# Patient Record
Sex: MALE | Race: WHITE | NOT HISPANIC OR LATINO
[De-identification: names, ages, dates, MRNs, and addresses within clinical notes are randomized per-mention and may not be internally consistent; named-entity substitution may affect disease eponyms.]

---

## 2018-03-28 ENCOUNTER — HOSPITAL ENCOUNTER (OUTPATIENT)
Dept: HOME HEALTH SERVICES | Facility: HOME HEALTHCARE | Age: 83
Setting detail: SPECIMEN
Discharge: HOME | End: 2018-03-28
Attending: FAMILY MEDICINE | Admitting: FAMILY MEDICINE

## 2018-03-28 LAB
BASOPHILS # BLD AUTO: 0 10*3/UL (ref 0–0.2)
BASOPHILS NFR BLD AUTO: 0 % (ref 0–2)
DIFFERENTIAL METHOD BLD: (no result)
EOSINOPHIL # BLD AUTO: 0 10*3/UL (ref 0–0.3)
EOSINOPHIL # BLD AUTO: 1 % (ref 0–3)
ERYTHROCYTE [DISTWIDTH] IN BLOOD BY AUTOMATED COUNT: 18.4 % (ref 11.5–14.5)
HCT VFR BLD AUTO: 33.5 % (ref 40–54)
HGB BLD-MCNC: 10.9 G/DL (ref 14–18)
LYMPHOCYTES # BLD AUTO: 0.7 10*3/UL (ref 0.8–4.8)
LYMPHOCYTES NFR BLD AUTO: 22 % (ref 18–42)
MCH RBC QN AUTO: 31.5 PG (ref 26–32)
MCHC RBC AUTO-ENTMCNC: 32.6 G/DL (ref 32–36)
MCV RBC AUTO: 96.7 FL (ref 80–94)
MONOCYTES # BLD AUTO: 0.5 10*3/UL (ref 0.1–1.3)
MONOCYTES NFR BLD AUTO: 14 % (ref 2–11)
NEUTROPHILS # BLD AUTO: 2.2 10*3/UL (ref 2.3–8.6)
NEUTROPHILS NFR BLD AUTO: 63 % (ref 50–75)
NRBC BLD AUTO-RTO: 0 /100{WBCS}
NRBC/RBC NFR BLD MANUAL: 0 10*3/UL
PLATELET # BLD AUTO: 133 10*3/UL (ref 150–450)
PMV BLD AUTO: 9.4 FL (ref 7.4–10.4)
RBC # BLD AUTO: 3.46 10*6/UL (ref 4.6–6)
WBC # BLD AUTO: 3.5 10*3/UL (ref 4.5–11.5)

## 2018-03-29 LAB
BILIRUB UR QL STRIP: NEGATIVE MG/DL
CASTS URNS QL MICRO: NORMAL /[LPF]
COLOR UR: YELLOW
CONV BACTERIA IN URINE MICRO: NEGATIVE
CONV CLARITY OF URINE: CLEAR
CONV HYALINE CASTS IN URINE MICRO: 1 /[LPF] (ref 0–5)
CONV PROTEIN IN URINE BY AUTOMATED TEST STRIP: NEGATIVE MG/DL
CONV SMALL ROUND CELLS: NORMAL /[HPF]
CONV UROBILINOGEN IN URINE BY AUTOMATED TEST STRIP: 0.2 MG/DL
CULTURE INDICATED?: NORMAL
GLUCOSE UR QL: NEGATIVE MG/DL
HGB UR QL STRIP: NEGATIVE
KETONES UR QL STRIP: NEGATIVE MG/DL
LEUKOCYTE ESTERASE UR QL STRIP: NEGATIVE
NITRITE UR QL STRIP: NEGATIVE
PH UR STRIP.AUTO: 7.5 [PH] (ref 4.5–8)
RBC #/AREA URNS HPF: 0 /[HPF] (ref 0–3)
SP GR UR: 1.01 (ref 1–1.03)
SPERM URNS QL MICRO: NORMAL /[HPF]
SQUAMOUS SPT QL MICRO: 0 /[HPF] (ref 0–5)
UNIDENT CRYS URNS QL MICRO: NORMAL /[HPF]
WBC #/AREA URNS HPF: 1 /[HPF] (ref 0–5)
YEAST SPEC QL WET PREP: NORMAL /[HPF]

## 2018-04-03 ENCOUNTER — HOSPITAL ENCOUNTER (OUTPATIENT)
Dept: HOME HEALTH SERVICES | Facility: HOME HEALTHCARE | Age: 83
Setting detail: SPECIMEN
Discharge: HOME | End: 2018-04-03
Attending: FAMILY MEDICINE | Admitting: FAMILY MEDICINE

## 2018-04-03 LAB
BASOPHILS # BLD AUTO: 0 10*3/UL (ref 0–0.2)
BASOPHILS NFR BLD AUTO: 1 % (ref 0–2)
DIFFERENTIAL METHOD BLD: (no result)
EOSINOPHIL # BLD AUTO: 0 10*3/UL (ref 0–0.3)
EOSINOPHIL # BLD AUTO: 1 % (ref 0–3)
ERYTHROCYTE [DISTWIDTH] IN BLOOD BY AUTOMATED COUNT: 19.4 % (ref 11.5–14.5)
HCT VFR BLD AUTO: 34.2 % (ref 40–54)
HGB BLD-MCNC: 11.2 G/DL (ref 14–18)
LYMPHOCYTES # BLD AUTO: 0.7 10*3/UL (ref 0.8–4.8)
LYMPHOCYTES NFR BLD AUTO: 22 % (ref 18–42)
MCH RBC QN AUTO: 31.8 PG (ref 26–32)
MCHC RBC AUTO-ENTMCNC: 32.9 G/DL (ref 32–36)
MCV RBC AUTO: 96.6 FL (ref 80–94)
MONOCYTES # BLD AUTO: 0.5 10*3/UL (ref 0.1–1.3)
MONOCYTES NFR BLD AUTO: 15 % (ref 2–11)
NEUTROPHILS # BLD AUTO: 2 10*3/UL (ref 2.3–8.6)
NEUTROPHILS NFR BLD AUTO: 61 % (ref 50–75)
NRBC BLD AUTO-RTO: 0 /100{WBCS}
NRBC/RBC NFR BLD MANUAL: 0 10*3/UL
PLATELET # BLD AUTO: 107 10*3/UL (ref 150–450)
PMV BLD AUTO: 9.5 FL (ref 7.4–10.4)
RBC # BLD AUTO: 3.54 10*6/UL (ref 4.6–6)
WBC # BLD AUTO: 3.3 10*3/UL (ref 4.5–11.5)

## 2018-04-18 ENCOUNTER — HOSPITAL ENCOUNTER (OUTPATIENT)
Dept: HOME HEALTH SERVICES | Facility: HOME HEALTHCARE | Age: 83
Setting detail: SPECIMEN
Discharge: HOME | End: 2018-04-18
Attending: FAMILY MEDICINE | Admitting: FAMILY MEDICINE

## 2018-04-18 LAB
BASOPHILS # BLD AUTO: 0 10*3/UL (ref 0–0.2)
BASOPHILS NFR BLD AUTO: 1 % (ref 0–2)
DIFFERENTIAL METHOD BLD: (no result)
EOSINOPHIL # BLD AUTO: 0 10*3/UL (ref 0–0.3)
EOSINOPHIL # BLD AUTO: 1 % (ref 0–3)
ERYTHROCYTE [DISTWIDTH] IN BLOOD BY AUTOMATED COUNT: 18.5 % (ref 11.5–14.5)
HCT VFR BLD AUTO: 37.2 % (ref 40–54)
HGB BLD-MCNC: 12.2 G/DL (ref 14–18)
LYMPHOCYTES # BLD AUTO: 0.9 10*3/UL (ref 0.8–4.8)
LYMPHOCYTES NFR BLD AUTO: 23 % (ref 18–42)
MCH RBC QN AUTO: 31.6 PG (ref 26–32)
MCHC RBC AUTO-ENTMCNC: 32.9 G/DL (ref 32–36)
MCV RBC AUTO: 96 FL (ref 80–94)
MONOCYTES # BLD AUTO: 0.5 10*3/UL (ref 0.1–1.3)
MONOCYTES NFR BLD AUTO: 14 % (ref 2–11)
NEUTROPHILS # BLD AUTO: 2.3 10*3/UL (ref 2.3–8.6)
NEUTROPHILS NFR BLD AUTO: 61 % (ref 50–75)
NRBC BLD AUTO-RTO: 0 /100{WBCS}
NRBC/RBC NFR BLD MANUAL: 0 10*3/UL
PLATELET # BLD AUTO: 129 10*3/UL (ref 150–450)
PMV BLD AUTO: 9.2 FL (ref 7.4–10.4)
RBC # BLD AUTO: 3.87 10*6/UL (ref 4.6–6)
WBC # BLD AUTO: 3.8 10*3/UL (ref 4.5–11.5)

## 2018-04-25 ENCOUNTER — OFFICE (OUTPATIENT)
Dept: URBAN - METROPOLITAN AREA CLINIC 64 | Facility: CLINIC | Age: 83
End: 2018-04-25
Payer: COMMERCIAL

## 2018-04-25 VITALS
WEIGHT: 174 LBS | HEART RATE: 85 BPM | SYSTOLIC BLOOD PRESSURE: 135 MMHG | HEIGHT: 67 IN | DIASTOLIC BLOOD PRESSURE: 81 MMHG

## 2018-04-25 DIAGNOSIS — D50.0 IRON DEFICIENCY ANEMIA SECONDARY TO BLOOD LOSS (CHRONIC): ICD-10-CM

## 2018-04-25 DIAGNOSIS — K21.9 GASTRO-ESOPHAGEAL REFLUX DISEASE WITHOUT ESOPHAGITIS: ICD-10-CM

## 2018-04-25 DIAGNOSIS — K59.00 CONSTIPATION, UNSPECIFIED: ICD-10-CM

## 2018-04-25 PROCEDURE — 99202 OFFICE O/P NEW SF 15 MIN: CPT | Performed by: INTERNAL MEDICINE

## 2018-06-07 ENCOUNTER — OFFICE (OUTPATIENT)
Dept: URBAN - METROPOLITAN AREA CLINIC 64 | Facility: CLINIC | Age: 83
End: 2018-06-07
Payer: COMMERCIAL

## 2018-06-07 VITALS
HEART RATE: 54 BPM | WEIGHT: 173 LBS | HEIGHT: 67 IN | SYSTOLIC BLOOD PRESSURE: 152 MMHG | DIASTOLIC BLOOD PRESSURE: 67 MMHG

## 2018-06-07 DIAGNOSIS — D50.0 IRON DEFICIENCY ANEMIA SECONDARY TO BLOOD LOSS (CHRONIC): ICD-10-CM

## 2018-06-07 DIAGNOSIS — K59.00 CONSTIPATION, UNSPECIFIED: ICD-10-CM

## 2018-06-07 PROCEDURE — 99214 OFFICE O/P EST MOD 30 MIN: CPT | Performed by: NURSE PRACTITIONER

## 2018-08-21 ENCOUNTER — HOSPITAL ENCOUNTER (OUTPATIENT)
Dept: LAB | Facility: HOSPITAL | Age: 83
Discharge: HOME | End: 2018-08-21
Attending: PHYSICIAN ASSISTANT | Admitting: PHYSICIAN ASSISTANT

## 2018-08-21 LAB — CREAT UR-MCNC: 1 MG/DL (ref 0.7–1.2)

## 2018-09-05 ENCOUNTER — HOSPITAL ENCOUNTER (OUTPATIENT)
Dept: CT IMAGING | Facility: HOSPITAL | Age: 83
Discharge: HOME | End: 2018-09-05
Attending: PHYSICIAN ASSISTANT | Admitting: PHYSICIAN ASSISTANT

## 2019-06-27 ENCOUNTER — CLINICAL SUPPORT NO REQUIREMENTS (OUTPATIENT)
Dept: CARDIOLOGY | Facility: CLINIC | Age: 84
End: 2019-06-27

## 2019-06-27 DIAGNOSIS — I49.5 SICK SINUS SYNDROME (HCC): Primary | ICD-10-CM

## 2019-06-27 DIAGNOSIS — Z95.0 PRESENCE OF CARDIAC PACEMAKER: ICD-10-CM

## 2019-06-27 PROCEDURE — 93294 REM INTERROG EVL PM/LDLS PM: CPT | Performed by: NURSE PRACTITIONER

## 2019-06-27 PROCEDURE — 93296 REM INTERROG EVL PM/IDS: CPT | Performed by: NURSE PRACTITIONER

## 2019-06-28 PROBLEM — I49.5 SICK SINUS SYNDROME (HCC): Status: ACTIVE | Noted: 2019-06-28

## 2019-06-28 PROBLEM — Z95.0 PRESENCE OF CARDIAC PACEMAKER: Status: ACTIVE | Noted: 2019-06-28

## 2019-06-28 NOTE — PROGRESS NOTES
REMOTE DEVICE INTERROGATION    Device Company: [employer]    Battery Stable.  Time to ALIS 8 years    Presenting EGM: A paced V sensed    Arrhythmia Logbook Reviewed: No sustained events    Continue remote device interrogations every 3 months.

## 2019-08-08 ENCOUNTER — OFFICE VISIT (OUTPATIENT)
Dept: CARDIOLOGY | Facility: CLINIC | Age: 84
End: 2019-08-08

## 2019-08-08 VITALS
HEART RATE: 60 BPM | SYSTOLIC BLOOD PRESSURE: 130 MMHG | WEIGHT: 171.6 LBS | BODY MASS INDEX: 26.01 KG/M2 | OXYGEN SATURATION: 98 % | HEIGHT: 68 IN | DIASTOLIC BLOOD PRESSURE: 68 MMHG

## 2019-08-08 DIAGNOSIS — I10 ESSENTIAL HYPERTENSION: ICD-10-CM

## 2019-08-08 DIAGNOSIS — Z95.0 PRESENCE OF CARDIAC PACEMAKER: ICD-10-CM

## 2019-08-08 DIAGNOSIS — I48.0 PAROXYSMAL ATRIAL FIBRILLATION (HCC): ICD-10-CM

## 2019-08-08 DIAGNOSIS — I63.10 CEREBROVASCULAR ACCIDENT (CVA) DUE TO EMBOLISM OF PRECEREBRAL ARTERY (HCC): ICD-10-CM

## 2019-08-08 DIAGNOSIS — I49.5 SICK SINUS SYNDROME (HCC): Primary | ICD-10-CM

## 2019-08-08 PROBLEM — I63.9 CEREBROVASCULAR ACCIDENT (CVA): Status: ACTIVE | Noted: 2019-08-08

## 2019-08-08 PROBLEM — E78.2 HYPERLIPIDEMIA, MIXED: Status: ACTIVE | Noted: 2019-08-08

## 2019-08-08 PROBLEM — I71.40 ABDOMINAL AORTIC ANEURYSM (AAA) (HCC): Status: ACTIVE | Noted: 2018-03-07

## 2019-08-08 PROCEDURE — 99213 OFFICE O/P EST LOW 20 MIN: CPT | Performed by: INTERNAL MEDICINE

## 2019-08-08 PROCEDURE — 93000 ELECTROCARDIOGRAM COMPLETE: CPT | Performed by: INTERNAL MEDICINE

## 2019-08-08 RX ORDER — POTASSIUM CHLORIDE 1500 MG/1
20 TABLET, FILM COATED, EXTENDED RELEASE ORAL 2 TIMES DAILY
COMMUNITY
Start: 2018-09-07 | End: 2021-12-15

## 2019-08-08 RX ORDER — DONEPEZIL HYDROCHLORIDE 10 MG/1
TABLET, FILM COATED ORAL
Refills: 5 | COMMUNITY
Start: 2019-07-07 | End: 2021-09-13

## 2019-08-08 RX ORDER — ROSUVASTATIN CALCIUM 10 MG/1
10 TABLET, COATED ORAL EVERY 24 HOURS
COMMUNITY
Start: 2017-10-17

## 2019-08-08 RX ORDER — TAMSULOSIN HYDROCHLORIDE 0.4 MG/1
1 CAPSULE ORAL DAILY
Refills: 1 | COMMUNITY
Start: 2019-07-08 | End: 2020-09-11

## 2019-08-08 RX ORDER — APIXABAN 2.5 MG/1
TABLET, FILM COATED ORAL 2 TIMES DAILY
Refills: 1 | COMMUNITY
Start: 2019-07-07

## 2019-08-08 RX ORDER — CARBIDOPA AND LEVODOPA 25; 100 MG/1; MG/1
TABLET, ORALLY DISINTEGRATING ORAL EVERY 12 HOURS
COMMUNITY
Start: 2019-01-08

## 2019-08-08 RX ORDER — LOSARTAN POTASSIUM 50 MG/1
TABLET ORAL DAILY
Refills: 5 | COMMUNITY
Start: 2019-07-07 | End: 2020-02-25 | Stop reason: ALTCHOICE

## 2019-08-08 RX ORDER — METOPROLOL SUCCINATE 50 MG/1
TABLET, EXTENDED RELEASE ORAL DAILY
Refills: 3 | COMMUNITY
Start: 2019-07-07 | End: 2019-10-05 | Stop reason: SDUPTHER

## 2019-08-08 RX ORDER — POLYETHYLENE GLYCOL 3350 17 G/17G
POWDER, FOR SOLUTION ORAL DAILY
COMMUNITY
Start: 2018-11-13

## 2019-08-08 RX ORDER — PANTOPRAZOLE SODIUM 40 MG/1
TABLET, DELAYED RELEASE ORAL DAILY
Refills: 5 | COMMUNITY
Start: 2019-07-07 | End: 2021-09-15 | Stop reason: SDDI

## 2019-08-08 NOTE — PROGRESS NOTES
Cardiology Office Visit Note      Referring physician:  Nitza    Reason For Followup: 6 month FU    HPI:  Jason Callejas is a 89 y.o. male  presents today for f/u history of  Paroxysmal afib/RVR, SSS, hypertensive heart dz, dyslipidemia and TIA.  Dual chamber PM placed 9/19/18.    Decreased functional status due to balance issues.  Ambulates with walker.     He returns today with no specific complaints.  He denies chest pain, dyspnea, PND,  orthopnea, palpitations, near syncope, lower extremity edema or feelings of his heart racing.             Past Medical History:   Diagnosis Date   • History of echocardiogram 08/2018   • History of Holter monitoring 08/2018   • Hyperlipidemia    • Hypertension    • Stroke (CMS/HCC)        Past Surgical History:   Procedure Laterality Date   • CHOLECYSTECTOMY     • TURP / TRANSURETHRAL INCISION / DRAINAGE PROSTATE      TURP         Current Outpatient Medications:   •  carbidopa-levodopa (PARCOPA)  MG per disintegrating tablet, Every 12 (Twelve) Hours., Disp: , Rfl:   •  donepezil (ARICEPT) 10 MG tablet, TK 1 T PO QD IN THE MAEVE, Disp: , Rfl: 5  •  ELIQUIS 2.5 MG tablet tablet, 2 (Two) Times a Day., Disp: , Rfl: 1  •  losartan (COZAAR) 50 MG tablet, Take  by mouth Daily., Disp: , Rfl: 5  •  MAGNESIUM-OXIDE 400 (241.3 Mg) MG tablet tablet, Take  by mouth Daily., Disp: , Rfl: 3  •  metoprolol succinate XL (TOPROL-XL) 50 MG 24 hr tablet, Take  by mouth Daily., Disp: , Rfl: 3  •  pantoprazole (PROTONIX) 40 MG EC tablet, Take  by mouth Daily., Disp: , Rfl: 5  •  polyethylene glycol (MIRALAX) powder, Daily., Disp: , Rfl:   •  potassium chloride ER (K-TAB) 20 MEQ tablet controlled-release ER tablet, POTASSIUM CHLORIDE ER 20 MEQ CR-TABS, Disp: , Rfl:   •  rosuvastatin (CRESTOR) 10 MG tablet, Daily., Disp: , Rfl:   •  tamsulosin (FLOMAX) 0.4 MG capsule 24 hr capsule, Daily., Disp: , Rfl: 1    Social History     Socioeconomic History   • Marital status:      Spouse name: Not  "on file   • Number of children: Not on file   • Years of education: Not on file   • Highest education level: Not on file   Tobacco Use   • Smoking status: Never Smoker   Substance and Sexual Activity   • Alcohol use: No     Frequency: Never   • Drug use: No       Family History   Family history unknown: Yes         Review of Systems   General: denies fever, chills, anorexia, weight loss  Eyes: denies blurring, diplopia  Ear/Nose/Throat: denies ear pain, nosebleeds, hoarseness  Cardiovascular: See HPI  Respiratory: denies excessive sputum, hemoptysis, wheezing  Gastrointestinal: denies nausea, vomiting, change in bowel habits, abdominal pain  Genitourinary: denies dysuria and hematuria  Musculoskeletal: Modest low back and weightbearing joint pain with lower extremity weakness and ataxia requiring him to use a walker now  Skin: denies rashes, itching, suspicious lesions  Neurologic: denies focal neuro deficits, though has problems with ataxia and walks with a walker  Psychiatric: denies depression, anxiety  Endocrine: denies cold intolerance, heat intolerance  Hematologic/Lymphatic: denies abnormal bruising, bleeding  Allergic/Immunologic: denies urticaria or persistent infections      Objective     Visit Vitals  /68   Pulse 60   Ht 171.5 cm (67.5\")   Wt 77.8 kg (171 lb 9.6 oz)   SpO2 98% Comment: room air   BMI 26.48 kg/m²           Physical Exam  General:      Pleasant, well developed,, in no acute distress.    Head:     normocephalic and atraumatic.    Eyes:    PERRL/EOM intact, conjunctiva and sclera clear with out nystagmus.    Neck:    no jvd or bruits  Chest Wall:    no deformities   Lungs:    clear bilaterally to auscultation with adequate global airflow   Heart:    non-displaced PMI; regular rate and rhythm, normal S1, S2 without murmurs, rubs, or gallops  Abdomen:  Soft, nontender without HSM  Msk:    no deformity; adequate R OM; his left knee and Ace wrap  Pulses:    pulses normal in all 4 " extremities.    Extremities:    no clubbing, cyanosis, edema   Neurologic:    no focal sensory or motor deficits; ambulates using a walker because of ataxia and lower extremity weakness  Skin:    intact without lesions or rashes.    Psych:    alert and cooperative; normal mood and affect; normal attention span and concentration.            ECG 12 Lead  Date/Time: 8/8/2019 11:46 PM  Performed by: ELISE Giles MD  Authorized by: ELISE Giles MD   Comparison: compared with previous ECG from 1/8/2019  Comparison to previous ECG: Previous tracing showed sinus rhythm with nonspecific T wave changes  Comments: Today's EKG shows 100% a pacing with OR interval of 240 ms and 100% V tracking with no ST nor T wave abnormalities              Assessment:   1. Sick sinus syndrome (CMS/HCC)  -Now well controlled with beta-blocker and permanent pacing    2. Presence of cardiac pacemaker  -Satisfactory device site and functioning    3. Cerebrovascular accident (CVA) due to embolism of precerebral artery (CMS/HCC)  -Modest residual neuropathy though now having problems with ataxia and lower extremity weakness    4. Paroxysmal atrial fibrillation (CMS/HCC)  -Currently maintaining sinus/a paced rhythm  -Maintaining full anticoagulation using Eliquis 2.5 mg p.o. twice daily    5. Essential hypertension  Well-controlled on current medications        Plan:  Continue current medications as listed and reviewed in detail today.  Return to clinic for follow-up in 6 months or sooner if needed.  Strongly cautioned on risk of falling    ELISE Giles MD  8/31/2019 11:47 PM

## 2019-09-26 ENCOUNTER — CLINICAL SUPPORT NO REQUIREMENTS (OUTPATIENT)
Dept: CARDIOLOGY | Facility: CLINIC | Age: 84
End: 2019-09-26

## 2019-09-26 DIAGNOSIS — I49.5 SICK SINUS SYNDROME (HCC): Primary | ICD-10-CM

## 2019-09-26 DIAGNOSIS — Z95.0 PRESENCE OF CARDIAC PACEMAKER: ICD-10-CM

## 2019-09-26 PROCEDURE — 93294 REM INTERROG EVL PM/LDLS PM: CPT | Performed by: NURSE PRACTITIONER

## 2019-09-26 PROCEDURE — 93296 REM INTERROG EVL PM/IDS: CPT | Performed by: NURSE PRACTITIONER

## 2019-09-30 NOTE — PROGRESS NOTES
REMOTE DEVICE INTERROGATION    Remote device interrogation is reviewed.     Device Company: [employer]    Battery Stable.  Time to ALIS 7.5 years    Presenting EGM: A paced V paced    Arrhythmia Logbook Reviewed: No sustained events    Device function appears Stable    Continue remote device interrogations every 3 months.

## 2019-10-07 RX ORDER — METOPROLOL SUCCINATE 50 MG/1
TABLET, EXTENDED RELEASE ORAL
Qty: 90 TABLET | Refills: 0 | Status: SHIPPED | OUTPATIENT
Start: 2019-10-07 | End: 2020-01-06

## 2019-12-26 ENCOUNTER — CLINICAL SUPPORT NO REQUIREMENTS (OUTPATIENT)
Dept: CARDIOLOGY | Facility: CLINIC | Age: 84
End: 2019-12-26

## 2019-12-26 DIAGNOSIS — Z95.0 PRESENCE OF CARDIAC PACEMAKER: ICD-10-CM

## 2019-12-26 DIAGNOSIS — I49.5 SICK SINUS SYNDROME (HCC): Primary | ICD-10-CM

## 2020-01-06 RX ORDER — METOPROLOL SUCCINATE 50 MG/1
TABLET, EXTENDED RELEASE ORAL
Qty: 90 TABLET | Refills: 3 | Status: SHIPPED | OUTPATIENT
Start: 2020-01-06 | End: 2021-09-13

## 2020-01-09 PROCEDURE — 93294 REM INTERROG EVL PM/LDLS PM: CPT | Performed by: NURSE PRACTITIONER

## 2020-01-09 PROCEDURE — 93296 REM INTERROG EVL PM/IDS: CPT | Performed by: NURSE PRACTITIONER

## 2020-01-09 NOTE — PROGRESS NOTES
REMOTE DEVICE INTERROGATION    Remote device interrogation is reviewed.     Device Company: [employer]    Battery Stable.  Time to ALIS 7.5 years    Presenting EGM: A paced V sense    Arrhythmia Logbook Reviewed: No sustained events    Device function appears Stable    Continue remote device interrogations every 3 months.

## 2020-02-19 ENCOUNTER — CLINICAL SUPPORT NO REQUIREMENTS (OUTPATIENT)
Dept: CARDIOLOGY | Facility: CLINIC | Age: 85
End: 2020-02-19

## 2020-02-19 ENCOUNTER — OFFICE VISIT (OUTPATIENT)
Dept: CARDIOLOGY | Facility: CLINIC | Age: 85
End: 2020-02-19

## 2020-02-19 VITALS
DIASTOLIC BLOOD PRESSURE: 52 MMHG | SYSTOLIC BLOOD PRESSURE: 92 MMHG | HEART RATE: 92 BPM | BODY MASS INDEX: 24.55 KG/M2 | HEIGHT: 68 IN | WEIGHT: 162 LBS

## 2020-02-19 DIAGNOSIS — I49.5 SICK SINUS SYNDROME (HCC): Primary | ICD-10-CM

## 2020-02-19 DIAGNOSIS — Z95.0 PRESENCE OF CARDIAC PACEMAKER: ICD-10-CM

## 2020-02-19 DIAGNOSIS — I48.0 PAROXYSMAL ATRIAL FIBRILLATION (HCC): ICD-10-CM

## 2020-02-19 DIAGNOSIS — I10 ESSENTIAL HYPERTENSION: ICD-10-CM

## 2020-02-19 PROCEDURE — 99214 OFFICE O/P EST MOD 30 MIN: CPT | Performed by: NURSE PRACTITIONER

## 2020-02-19 PROCEDURE — 93280 PM DEVICE PROGR EVAL DUAL: CPT | Performed by: NURSE PRACTITIONER

## 2020-02-19 PROCEDURE — 93000 ELECTROCARDIOGRAM COMPLETE: CPT | Performed by: NURSE PRACTITIONER

## 2020-02-25 NOTE — PROGRESS NOTES
Cardiology Office Follow Up Visit      Primary Care Provider:  Anish Goddard MD    Reason for f/u:     Sick sinus syndrome  Hypotension  History of hypertension  Paroxysmal atrial fibrillation  Dual-chamber pacemaker      Subjective     CC:    Denies chest pain or dyspnea    History of Present Illness       Jason Callejas is a 89 y.o. male.  He has a history of sick sinus syndrome with dual-chamber pacemaker implanted by Dr. Giles in September 2018.  Additional past medical history is significant for paroxysmal atrial fibrillation, hypertension, dyslipidemia and previous TIA.    He has recently had shingles across his back.    He has decreased functional status due to balance problems and ambulates with a walker.    He does report recently that his blood pressures have been running on the lower side and he has been dizzy at times.    He denies any chest pain, dyspnea, PND, orthopnea, palpitations, near syncope, lower extremity edema or feelings of his heart racing.  He reports compliance with medical therapy.    Last office follow-up note is reviewed.        Past Medical History:   Diagnosis Date   • Abdominal aortic aneurysm (AAA) (CMS/[facility]) 3/7/2018   • Cerebrovascular accident (CVA) (CMS/[facility]) 8/8/2019   • Essential hypertension 8/8/2019   • History of echocardiogram 08/2018   • History of Holter monitoring 08/2018   • Hyperlipidemia    • Hyperlipidemia, mixed 8/8/2019   • Hypertension    • Paroxysmal atrial fibrillation (CMS/HCC) 10/15/2012   • Presence of cardiac pacemaker 6/28/2019    BS PM implant 9/2018   • Sick sinus syndrome (CMS/HCC) 6/28/2019   • Stroke (CMS/[facility])        Past Surgical History:   Procedure Laterality Date   • CHOLECYSTECTOMY     • TURP / TRANSURETHRAL INCISION / DRAINAGE PROSTATE      TURP         Current Outpatient Medications:   •  carbidopa-levodopa (PARCOPA)  MG per disintegrating tablet, Every 12 (Twelve) Hours., Disp: , Rfl:   •  donepezil (ARICEPT) 10 MG tablet, TK 1 T PO  QD IN THE MAEVE, Disp: , Rfl: 5  •  ELIQUIS 2.5 MG tablet tablet, 2 (Two) Times a Day., Disp: , Rfl: 1  •  ipratropium (ATROVENT) 0.03 % nasal spray, U 2 SPRAYS IEN 2 TO 3 XD, Disp: , Rfl:   •  losartan (COZAAR) 50 MG tablet, Take  by mouth Daily., Disp: , Rfl: 5  •  MAGNESIUM-OXIDE 400 (241.3 Mg) MG tablet tablet, Take  by mouth Daily., Disp: , Rfl: 3  •  metoprolol succinate XL (TOPROL-XL) 50 MG 24 hr tablet, TAKE 1 TABLET BY MOUTH EVERY DAY, Disp: 90 tablet, Rfl: 3  •  MYRBETRIQ 50 MG tablet sustained-release 24 hour 24 hr tablet, Take 50 mg by mouth Daily., Disp: , Rfl:   •  pantoprazole (PROTONIX) 40 MG EC tablet, Take  by mouth Daily., Disp: , Rfl: 5  •  polyethylene glycol (MIRALAX) powder, Daily., Disp: , Rfl:   •  potassium chloride ER (K-TAB) 20 MEQ tablet controlled-release ER tablet, Take 20 mEq by mouth 2 (Two) Times a Day., Disp: , Rfl:   •  rosuvastatin (CRESTOR) 10 MG tablet, Take 10 mg by mouth Daily., Disp: , Rfl:   •  tamsulosin (FLOMAX) 0.4 MG capsule 24 hr capsule, 1 capsule Daily., Disp: , Rfl: 1    Social History     Socioeconomic History   • Marital status:      Spouse name: Not on file   • Number of children: Not on file   • Years of education: Not on file   • Highest education level: Not on file   Tobacco Use   • Smoking status: Never Smoker   Substance and Sexual Activity   • Alcohol use: No     Frequency: Never   • Drug use: No   • Sexual activity: Defer       Family History   Family history unknown: Yes       The following portions of the patient's history were reviewed and updated as appropriate: allergies, current medications, past family history, past medical history, past social history, past surgical history and problem list.    Review of Systems   Constitution: Negative for decreased appetite and diaphoresis.   HENT: Negative for congestion, hearing loss and nosebleeds.    Cardiovascular: Negative for chest pain, claudication, dyspnea on exertion, irregular heartbeat, leg  "swelling, near-syncope, orthopnea, palpitations, paroxysmal nocturnal dyspnea and syncope.   Respiratory: Negative for cough, shortness of breath and sleep disturbances due to breathing.    Endocrine: Negative for polyuria.   Hematologic/Lymphatic: Does not bruise/bleed easily.   Skin: Negative for itching and rash.   Musculoskeletal: Positive for arthritis and muscle weakness. Negative for back pain and myalgias.   Gastrointestinal: Negative for abdominal pain, change in bowel habit and nausea.   Genitourinary: Negative for dysuria, flank pain, frequency and hesitancy.   Neurological: Positive for dizziness and weakness. Negative for tremors.   Psychiatric/Behavioral: Negative for altered mental status. The patient does not have insomnia.        BP 92/52   Pulse 92   Ht 171.5 cm (67.52\")   Wt 73.5 kg (162 lb)   BMI 24.98 kg/m² .    Objective     Physical Exam   Constitutional: He is oriented to person, place, and time. He appears well-developed and well-nourished. No distress.   HENT:   Head: Normocephalic and atraumatic.   Eyes: Pupils are equal, round, and reactive to light.   Neck: Normal range of motion. Neck supple. No JVD present.   Cardiovascular: Normal rate, regular rhythm, S1 normal, S2 normal, normal heart sounds and intact distal pulses.   No murmur heard.  Pulmonary/Chest: Effort normal and breath sounds normal.   Abdominal: Soft. Normal appearance. He exhibits no distension. There is no tenderness.   Musculoskeletal: Normal range of motion. He exhibits no edema.   Neurological: He is alert and oriented to person, place, and time.   Skin: Skin is warm and dry.   Psychiatric: He has a normal mood and affect.           ECG 12 Lead  Date/Time: 2/19/2020 3:05 PM  Performed by: Jillian Lunsford APRN  Authorized by: Jillian Lunsford APRN   Comparison: not compared with previous ECG   Previous ECG: no previous ECG available  Rhythm: sinus rhythm  Rate: normal  BPM: 92  Conduction: conduction normal  ST " Segments: ST segments normal  T Waves: T waves normal  QRS axis: normal  Other: no other findings    Clinical impression: normal ECG                In Office Device Interrogation:     DEVICE INTERROGATION:  IN OFFICE    DEVICE TYPE:   Dual-chamber pacemaker    :   [employer]    BATTERY:  Stable    TIME TO ELECTIVE REPLACEMENT INDICATORS:   8 years    CHARGE TIME:   Not applicable        LEAD DATA:    Atrial:   7.6 mV, 373 ohms, 0.8 V@0.4 ms    Ventricular:     14.4 mV, 792 ohms, 0.1 V@0.4 ms    LV:      Atrial pacing percentage: 65 %    Ventricular pacing percentage: 32 %      Arrhythmia Logbook Reviewed: Less than 1        Summary:    Stable Device Function    No significant arhythmia burden.     Battery status is stable.      NEXT IN OFFICE DEVICE CHECK DUE: 1 year    REMOTE DEVICE INTERROGATIONS: 3 months      Diagnoses and all orders for this visit:    1. Paroxysmal atrial fibrillation (CMS/HCC) (Primary)  Comments:  No recent recurrent atrial fibrillation noted on device interrogation    2. Sick sinus syndrome (CMS/HCC)  Comments:  Stable since pacemaker implant    3. Essential hypertension  Comments:  Blood pressure is actually running on the low side though he has a history of hypertension.  Losartan was discontinued    4. Presence of cardiac pacemaker  Comments:  Stable device function    Other orders  -     ECG 12 Lead                Plan:  Patient recently recently had shingles.  They are improving.  His blood pressures have been running low.  Currently 92/52.  At this time I have asked him to hold his losartan.  I have asked him to log his blood pressures at home.  Goal systolic would be less than 130.  If his blood pressure starts creeping above this number of asked him to contact our office and we will consider resuming a lower dose losartan.  I have made no other changes in his medicine.  His device function is stable.  We will continue other current medicines as prescribed.    He  will have scheduled follow-up with Dr. Giles in 6 months I have asked him to contact the office sooner if he should develop any new or worsening problems

## 2020-04-09 ENCOUNTER — CLINICAL SUPPORT NO REQUIREMENTS (OUTPATIENT)
Dept: CARDIOLOGY | Facility: CLINIC | Age: 85
End: 2020-04-09

## 2020-04-09 DIAGNOSIS — I49.5 SICK SINUS SYNDROME (HCC): Primary | ICD-10-CM

## 2020-04-09 DIAGNOSIS — Z95.0 PRESENCE OF CARDIAC PACEMAKER: ICD-10-CM

## 2020-04-09 PROCEDURE — 93296 REM INTERROG EVL PM/IDS: CPT | Performed by: NURSE PRACTITIONER

## 2020-04-09 PROCEDURE — 93294 REM INTERROG EVL PM/LDLS PM: CPT | Performed by: NURSE PRACTITIONER

## 2020-04-09 NOTE — PROGRESS NOTES
REMOTE DEVICE INTERROGATION    Received and reviewed on:   4/9/2020    Remote device interrogation is reviewed.     Device Company: [employer]    Battery Stable.  Time to ALIS 7.5 years    Presenting EGM: A sensed V sense    Arrhythmia Logbook Reviewed: Very brief SVT lasting less than 20 seconds no recurrent    Device function appears Stable    Continue remote device interrogations every 3 months.

## 2020-05-18 ENCOUNTER — OFFICE VISIT (OUTPATIENT)
Dept: CARDIOLOGY | Facility: CLINIC | Age: 85
End: 2020-05-18

## 2020-05-18 VITALS
DIASTOLIC BLOOD PRESSURE: 58 MMHG | SYSTOLIC BLOOD PRESSURE: 129 MMHG | WEIGHT: 162 LBS | HEART RATE: 60 BPM | HEIGHT: 68 IN | BODY MASS INDEX: 24.55 KG/M2

## 2020-05-18 DIAGNOSIS — E78.2 HYPERLIPIDEMIA, MIXED: ICD-10-CM

## 2020-05-18 DIAGNOSIS — I49.5 SICK SINUS SYNDROME (HCC): Primary | ICD-10-CM

## 2020-05-18 DIAGNOSIS — I48.0 PAROXYSMAL ATRIAL FIBRILLATION (HCC): ICD-10-CM

## 2020-05-18 DIAGNOSIS — Z95.0 PRESENCE OF CARDIAC PACEMAKER: ICD-10-CM

## 2020-05-18 DIAGNOSIS — I10 ESSENTIAL HYPERTENSION: ICD-10-CM

## 2020-05-18 DIAGNOSIS — I63.10 CEREBROVASCULAR ACCIDENT (CVA) DUE TO EMBOLISM OF PRECEREBRAL ARTERY (HCC): ICD-10-CM

## 2020-05-18 PROCEDURE — 99442 PR PHYS/QHP TELEPHONE EVALUATION 11-20 MIN: CPT | Performed by: INTERNAL MEDICINE

## 2020-05-18 NOTE — ASSESSMENT & PLAN NOTE
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

## 2020-05-18 NOTE — PROGRESS NOTES
Cardiology Office Visit Note      Referring physician:  Dr. Anish Goddard    Reason For Followup: 3 month follow up  You have chosen to receive care through a telephone visit. Do you consent to use a telephone visit for your medical care today? yes  HPI:  Jason Callejas is a 89 y.o. male reports today for a telephone visit .He has a history of sick sinus syndrome with dual-chamber pacemaker implanted by Dr. Giles in September 2018.  Additional past medical history is significant for paroxysmal atrial fibrillation, hypertension, dyslipidemia and previous TIA.    Patient does check his b/p at home and its ranging in the 120's; heart rate well regulated with rates usually in the 60s    He has decreased functional status due to balance problems and ambulates with a walker.      He denies any chest pain, dyspnea, PND, orthopnea, palpitations, near syncope, lower extremity edema or feelings of his heart racing.  He reports compliance with medical therapy.  This visit has been rescheduled as a phone visit to comply with patient safety concerns in accordance with CDC recommendations. Total time of discussion was 14 minutes.      .     Past Medical History:   Diagnosis Date   • Abdominal aortic aneurysm (AAA) (CMS/[facility]) 3/7/2018   • Cerebrovascular accident (CVA) (CMS/HCC) 8/8/2019   • Essential hypertension 8/8/2019   • History of echocardiogram 08/2018   • History of Holter monitoring 08/2018   • Hyperlipidemia    • Hyperlipidemia, mixed 8/8/2019   • Hypertension    • Paroxysmal atrial fibrillation (CMS/HCC) 10/15/2012   • Presence of cardiac pacemaker 6/28/2019    BS PM implant 9/2018   • Sick sinus syndrome (CMS/HCC) 6/28/2019   • Stroke (CMS/[facility])        Past Surgical History:   Procedure Laterality Date   • CHOLECYSTECTOMY     • TURP / TRANSURETHRAL INCISION / DRAINAGE PROSTATE      TURP         Current Outpatient Medications:   •  carbidopa-levodopa (PARCOPA)  MG per disintegrating tablet, Every 12 (Twelve)  Hours., Disp: , Rfl:   •  donepezil (ARICEPT) 10 MG tablet, TK 1 T PO QD IN THE MAEVE, Disp: , Rfl: 5  •  ELIQUIS 2.5 MG tablet tablet, 2 (Two) Times a Day., Disp: , Rfl: 1  •  ipratropium (ATROVENT) 0.03 % nasal spray, U 2 SPRAYS IEN 2 TO 3 XD, Disp: , Rfl:   •  MAGNESIUM-OXIDE 400 (241.3 Mg) MG tablet tablet, Take  by mouth Daily., Disp: , Rfl: 3  •  metoprolol succinate XL (TOPROL-XL) 50 MG 24 hr tablet, TAKE 1 TABLET BY MOUTH EVERY DAY, Disp: 90 tablet, Rfl: 3  •  MYRBETRIQ 50 MG tablet sustained-release 24 hour 24 hr tablet, Take 50 mg by mouth Daily., Disp: , Rfl:   •  pantoprazole (PROTONIX) 40 MG EC tablet, Take  by mouth Daily., Disp: , Rfl: 5  •  polyethylene glycol (MIRALAX) powder, Daily., Disp: , Rfl:   •  potassium chloride ER (K-TAB) 20 MEQ tablet controlled-release ER tablet, Take 20 mEq by mouth 2 (Two) Times a Day., Disp: , Rfl:   •  rosuvastatin (CRESTOR) 10 MG tablet, Take 10 mg by mouth Daily., Disp: , Rfl:   •  tamsulosin (FLOMAX) 0.4 MG capsule 24 hr capsule, 1 capsule Daily., Disp: , Rfl: 1    Social History     Socioeconomic History   • Marital status:      Spouse name: Not on file   • Number of children: Not on file   • Years of education: Not on file   • Highest education level: Not on file   Tobacco Use   • Smoking status: Never Smoker   • Smokeless tobacco: Never Used   Substance and Sexual Activity   • Alcohol use: No     Frequency: Never   • Drug use: No   • Sexual activity: Defer       Family History   Family history unknown: Yes         Review of Systems   General: denies fever, chills, anorexia, weight loss  Eyes: denies blurring, diplopia  Ear/Nose/Throat: denies ear pain, nosebleeds, hoarseness, though does have substantial sensorineural hearing loss-making it very difficult to communicate with him over the telephone, so wife supplied most of the history during today's phone visit.  Cardiovascular: See HPI  Respiratory: denies excessive sputum, hemoptysis,  "wheezing  Gastrointestinal: denies nausea, vomiting, change in bowel habits, abdominal pain  Genitourinary: Nocturia reported usually once to twice nightly; denies hematuria or dysuria  Musculoskeletal: Modest generalized arthralgias with lower extremity weakness requiring use of walker  Skin: denies rashes, itching, suspicious lesions  Neurologic: denies focal neuro deficits, though does have ataxia and declining cognitive function  Psychiatric: denies depression, anxiety  Endocrine: denies cold intolerance, heat intolerance  Hematologic/Lymphatic: denies abnormal bruising, bleeding  Allergic/Immunologic: denies urticaria or persistent infections      Objective     Visit Vitals  /58   Pulse 60   Ht 171.5 cm (67.52\")   Wt 73.5 kg (162 lb)   BMI 24.98 kg/m²           Physical Exam-this is a limited physical exam description based entirely on the patient and his wife's observations as they were reported to me during today's telephone visit.  General:      Pleasant, well-nourished, well developed,, in no acute distress.  HEENT: Unremarkable with no scleral icterus or loss of visual acuity.  Does have substantial sensorineural hearing loss despite wearing hearing aids has very difficult time with hearing, especially on the telephone.  Neck: Adequate ROM without neck masses or JVD noted.  Lungs: Demonstrate satisfactory respiratory rate and pattern with no conversational dyspnea.  Cardiac reports regular heart rhythm and normal heart rates.  Denies irregular or fast heart beating episodes.  Abdomen: Soft without localized tenderness or masses.   Msk:    no deformity; adequate R OM  Pulses:    pulses were not assessed on today's phone visit   Extremities:    no edema or peripheral cyanosis appreciated  Neurologic:    no focal sensory or motor deficits, though does have significant ataxia  Skin:    intact without lesions or rashes.    Psych:    alert and cooperative; normal mood and affect; normal attention span and " concentration.          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 Callejas advised to continue current medications as listed and reviewed in detail today.  As always, he and his wife are strongly cautioned against any increased risk for falling.  He understands he is to use his walker at all times.  We hope to see Mr. Callejas for follow-up in the office in 4 months or sooner if needed.    ELISE Giles MD  5/18/2020 14:45    This report was generated using the Dragon voice recognition system.

## 2020-05-18 NOTE — ASSESSMENT & PLAN NOTE
Generally maintained in sinus rhythm with Toprol-XL and AV sequential pacemaker though remains fully anticoagulated using low-dose Eliquis

## 2020-07-14 ENCOUNTER — CLINICAL SUPPORT NO REQUIREMENTS (OUTPATIENT)
Dept: CARDIOLOGY | Facility: CLINIC | Age: 85
End: 2020-07-14

## 2020-07-14 DIAGNOSIS — Z95.0 PRESENCE OF CARDIAC PACEMAKER: ICD-10-CM

## 2020-07-14 DIAGNOSIS — I49.5 SICK SINUS SYNDROME (HCC): Primary | ICD-10-CM

## 2020-07-14 PROCEDURE — 93294 REM INTERROG EVL PM/LDLS PM: CPT | Performed by: NURSE PRACTITIONER

## 2020-07-14 PROCEDURE — 93296 REM INTERROG EVL PM/IDS: CPT | Performed by: NURSE PRACTITIONER

## 2020-07-20 NOTE — PROGRESS NOTES
REMOTE DEVICE INTERROGATION    Received and reviewed on:   7/14/2020    Remote device interrogation is reviewed.     Device Company: [employer]    Battery Stable.  Time to ALIS 7 years    Presenting EGM: A sensed V sense    Arrhythmia Logbook Reviewed: No sustained events    Device function appears Stable    Continue remote device interrogations every 3 months.

## 2020-09-11 ENCOUNTER — OFFICE VISIT (OUTPATIENT)
Dept: CARDIOLOGY | Facility: CLINIC | Age: 85
End: 2020-09-11

## 2020-09-11 VITALS
BODY MASS INDEX: 23.19 KG/M2 | SYSTOLIC BLOOD PRESSURE: 120 MMHG | OXYGEN SATURATION: 96 % | HEART RATE: 64 BPM | WEIGHT: 153 LBS | DIASTOLIC BLOOD PRESSURE: 60 MMHG | HEIGHT: 68 IN

## 2020-09-11 DIAGNOSIS — I63.10 CEREBROVASCULAR ACCIDENT (CVA) DUE TO EMBOLISM OF PRECEREBRAL ARTERY (HCC): ICD-10-CM

## 2020-09-11 DIAGNOSIS — I49.5 SICK SINUS SYNDROME (HCC): Primary | ICD-10-CM

## 2020-09-11 PROCEDURE — 93000 ELECTROCARDIOGRAM COMPLETE: CPT | Performed by: INTERNAL MEDICINE

## 2020-09-11 PROCEDURE — 99213 OFFICE O/P EST LOW 20 MIN: CPT | Performed by: INTERNAL MEDICINE

## 2020-09-11 NOTE — PROGRESS NOTES
Cardiology Office Visit Note      Referring physician:  Anish Goddard MD    Reason For Followup:     HPI:  Jason Callejas is a 89 y.o. male. Presents to [facility] for a follow up visit. He has a history of sick sinus syndrome with dual-chamber pacemaker implanted by Dr. Giles in September 2018.  Additional past medical history is significant for paroxysmal atrial fibrillation, hypertension, dyslipidemia and previous TIA.    Patient does check his b/p at home and its ranging in the 120's; heart rate well regulated with rates usually in the 60s    He has decreased functional status due to balance problems and ambulates with a walker.      He denies any chest pain, dyspnea, PND, orthopnea, palpitations, near syncope, lower extremity edema or feelings of his heart racing.  He reports compliance with medical therapy.          Past Medical History:   Diagnosis Date   • Abdominal aortic aneurysm (AAA) (CMS/[facility]) 3/7/2018   • Cerebrovascular accident (CVA) (CMS/[facility]) 8/8/2019   • Essential hypertension 8/8/2019   • History of echocardiogram 08/2018   • History of Holter monitoring 08/2018   • Hyperlipidemia    • Hyperlipidemia, mixed 8/8/2019   • Hypertension    • Paroxysmal atrial fibrillation (CMS/HCC) 10/15/2012   • Presence of cardiac pacemaker 6/28/2019    BS PM implant 9/2018   • Sick sinus syndrome (CMS/[facility]) 6/28/2019   • Stroke (CMS/[facility])        Past Surgical History:   Procedure Laterality Date   • CHOLECYSTECTOMY     • TURP / TRANSURETHRAL INCISION / DRAINAGE PROSTATE      TURP         Current Outpatient Medications:   •  carbidopa-levodopa (PARCOPA)  MG per disintegrating tablet, Every 12 (Twelve) Hours., Disp: , Rfl:   •  donepezil (ARICEPT) 10 MG tablet, TK 1 T PO QD IN THE MAEVE, Disp: , Rfl: 5  •  ELIQUIS 2.5 MG tablet tablet, 2 (Two) Times a Day., Disp: , Rfl: 1  •  ipratropium (ATROVENT) 0.03 % nasal spray, U 2 SPRAYS IEN 2 TO 3 XD, Disp: , Rfl:   •  MAGNESIUM-OXIDE 400 (241.3 Mg) MG tablet tablet, Take   by mouth Daily., Disp: , Rfl: 3  •  metoprolol succinate XL (TOPROL-XL) 50 MG 24 hr tablet, TAKE 1 TABLET BY MOUTH EVERY DAY, Disp: 90 tablet, Rfl: 3  •  MYRBETRIQ 50 MG tablet sustained-release 24 hour 24 hr tablet, Take 50 mg by mouth Daily., Disp: , Rfl:   •  pantoprazole (PROTONIX) 40 MG EC tablet, Take  by mouth Daily., Disp: , Rfl: 5  •  polyethylene glycol (MIRALAX) powder, Daily., Disp: , Rfl:   •  potassium chloride ER (K-TAB) 20 MEQ tablet controlled-release ER tablet, Take 20 mEq by mouth 2 (Two) Times a Day., Disp: , Rfl:   •  rosuvastatin (CRESTOR) 10 MG tablet, Take 10 mg by mouth Daily., Disp: , Rfl:     Social History     Socioeconomic History   • Marital status:      Spouse name: Not on file   • Number of children: Not on file   • Years of education: Not on file   • Highest education level: Not on file   Tobacco Use   • Smoking status: Never Smoker   • Smokeless tobacco: Never Used   Substance and Sexual Activity   • Alcohol use: No     Frequency: Never   • Drug use: No   • Sexual activity: Defer       Family History   Family history unknown: Yes         Review of Systems   General: denies fever, chills, anorexia, weight loss  Eyes: denies blurring, diplopia  Ear/Nose/Throat: denies ear pain, nosebleeds, hoarseness; though, extremely hard of hearing  Cardiovascular: See HPI  Respiratory: denies excessive sputum, hemoptysis, wheezing  Gastrointestinal: denies nausea, vomiting, change in bowel habits, abdominal pain  Genitourinary: Nocturia usually 1 or 2 times per night but denies hematuria or dysuria  Musculoskeletal: Modest low back and weight bearing arthralgias  Skin: denies rashes, itching, suspicious lesions  Neurologic: denies focal neuro deficits  Psychiatric: denies depression, anxiety  Endocrine: denies cold intolerance, heat intolerance  Hematologic/Lymphatic: denies abnormal bruising, bleeding  Allergic/Immunologic: denies urticaria or persistent infections      Objective     Visit  "Vitals  /60   Pulse 64   Ht 171.5 cm (67.52\")   Wt 69.4 kg (153 lb)   SpO2 96%   BMI 23.60 kg/m²           Physical Exam  General:     Obese, well developed,, in no acute distress.    Head:     normocephalic and atraumatic.    Eyes:    PERRL/EOM intact, conjunctiva and sclera clear with out nystagmus  Ears: Severe sensorineural hearing loss.  Remains very hard of hearing despite having hearing aids.    Neck:    no jvd or bruits  Chest Wall:    no deformities   Lungs:    clear bilaterally to auscultation with adequate global airflow   Heart:    non-displaced PMI; regular rate and rhythm, normal S1, S2 without murmurs, rubs, or gallops  Abdomen:  Soft, nontender without HSM  Msk:    no deformity; adequate R OM  Pulses:    pulses normal in all 4 extremities.    Extremities:    no clubbing, cyanosis, edema   Neurologic:    no focal sensory or motor deficits  Skin:    intact without lesions or rashes.    Psych:    alert and cooperative; normal mood and affect; normal attention span and concentration.            ECG 12 Lead    Date/Time: 9/11/2020 10:40 AM  Performed by: ELISE Giles MD  Authorized by: ELISE Giles MD   Comparison: compared with previous ECG from 2/25/2020  Similar to previous ECG  Rhythm: paced  QRS axis: left    Clinical impression: abnormal EKG  Comments: Man atrial paced rhythm with ventricular sensing, no other significant normalities              Assessment:   Problems Addressed this Visit        Cardiovascular and Mediastinum    Sick sinus syndrome (CMS/HCC) - Primary    Well-controlled with AV sequential pacemaker and beta-blocker        Cerebrovascular accident (CVA) (CMS/HCC)    -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  Return to clinic 6 months or sooner if needed    ELISE Giles MD  9/27/2020 21:41 EDT    This report was generated using the Dragon voice recognition system.

## 2021-03-15 ENCOUNTER — OFFICE VISIT (OUTPATIENT)
Dept: CARDIOLOGY | Facility: CLINIC | Age: 86
End: 2021-03-15

## 2021-03-15 VITALS
OXYGEN SATURATION: 98 % | DIASTOLIC BLOOD PRESSURE: 72 MMHG | BODY MASS INDEX: 26.06 KG/M2 | SYSTOLIC BLOOD PRESSURE: 126 MMHG | HEIGHT: 67 IN | WEIGHT: 166 LBS | HEART RATE: 80 BPM

## 2021-03-15 DIAGNOSIS — I63.10 CEREBROVASCULAR ACCIDENT (CVA) DUE TO EMBOLISM OF PRECEREBRAL ARTERY (HCC): ICD-10-CM

## 2021-03-15 DIAGNOSIS — I49.5 SICK SINUS SYNDROME (HCC): ICD-10-CM

## 2021-03-15 DIAGNOSIS — I48.0 PAROXYSMAL ATRIAL FIBRILLATION (HCC): Primary | ICD-10-CM

## 2021-03-15 PROCEDURE — 93000 ELECTROCARDIOGRAM COMPLETE: CPT | Performed by: INTERNAL MEDICINE

## 2021-03-15 PROCEDURE — 99213 OFFICE O/P EST LOW 20 MIN: CPT | Performed by: INTERNAL MEDICINE

## 2021-03-15 RX ORDER — LEVOCETIRIZINE DIHYDROCHLORIDE 5 MG/1
5 TABLET, FILM COATED ORAL EVERY EVENING
COMMUNITY
Start: 2021-02-25 | End: 2021-09-15 | Stop reason: SDDI

## 2021-04-16 PROCEDURE — 93294 REM INTERROG EVL PM/LDLS PM: CPT | Performed by: NURSE PRACTITIONER

## 2021-04-16 PROCEDURE — 93296 REM INTERROG EVL PM/IDS: CPT | Performed by: NURSE PRACTITIONER

## 2021-07-16 PROCEDURE — 93294 REM INTERROG EVL PM/LDLS PM: CPT | Performed by: NURSE PRACTITIONER

## 2021-07-16 PROCEDURE — 93296 REM INTERROG EVL PM/IDS: CPT | Performed by: NURSE PRACTITIONER

## 2021-09-13 ENCOUNTER — CLINICAL SUPPORT NO REQUIREMENTS (OUTPATIENT)
Dept: CARDIOLOGY | Facility: CLINIC | Age: 86
End: 2021-09-13

## 2021-09-13 ENCOUNTER — OFFICE VISIT (OUTPATIENT)
Dept: CARDIOLOGY | Facility: CLINIC | Age: 86
End: 2021-09-13

## 2021-09-13 VITALS
HEIGHT: 67 IN | WEIGHT: 163 LBS | BODY MASS INDEX: 25.58 KG/M2 | SYSTOLIC BLOOD PRESSURE: 134 MMHG | OXYGEN SATURATION: 97 % | DIASTOLIC BLOOD PRESSURE: 72 MMHG | HEART RATE: 110 BPM

## 2021-09-13 DIAGNOSIS — I10 ESSENTIAL HYPERTENSION: ICD-10-CM

## 2021-09-13 DIAGNOSIS — Z95.0 PRESENCE OF CARDIAC PACEMAKER: ICD-10-CM

## 2021-09-13 DIAGNOSIS — I48.0 PAROXYSMAL ATRIAL FIBRILLATION (HCC): ICD-10-CM

## 2021-09-13 DIAGNOSIS — I49.5 SICK SINUS SYNDROME (HCC): Primary | ICD-10-CM

## 2021-09-13 DIAGNOSIS — I47.1 PSVT (PAROXYSMAL SUPRAVENTRICULAR TACHYCARDIA) (HCC): ICD-10-CM

## 2021-09-13 PROCEDURE — 99214 OFFICE O/P EST MOD 30 MIN: CPT | Performed by: NURSE PRACTITIONER

## 2021-09-13 PROCEDURE — 93280 PM DEVICE PROGR EVAL DUAL: CPT | Performed by: NURSE PRACTITIONER

## 2021-09-13 RX ORDER — METOPROLOL SUCCINATE 25 MG/1
25 TABLET, EXTENDED RELEASE ORAL DAILY
Qty: 30 TABLET | Refills: 11 | Status: SHIPPED | OUTPATIENT
Start: 2021-09-13 | End: 2022-09-12

## 2021-09-15 PROBLEM — I47.1 PSVT (PAROXYSMAL SUPRAVENTRICULAR TACHYCARDIA) (HCC): Status: ACTIVE | Noted: 2021-09-15

## 2021-09-15 PROCEDURE — 93000 ELECTROCARDIOGRAM COMPLETE: CPT | Performed by: NURSE PRACTITIONER

## 2021-09-15 NOTE — PROGRESS NOTES
Cardiology Office Follow Up Visit      Primary Care Provider:  Anish Goddard MD    Reason for f/u:     Sick sinus syndrome  Dual-chamber pacemaker  Hypertension  PAF  PSVT      Subjective     CC:    Denies chest pain or dyspnea    History of Present Illness       Jason Callejas is a 89 y.o. male.  Patient is here today for follow-up regarding his history of sick sinus syndrome and dual-chamber [employer] pacemaker implanted back in September 2018.    Additional past medical history is significant for paroxysmal atrial fibrillation, hypertension, dyslipidemia, previous TIA, Parkinson's disease.  He ambulates with a walker.    At this time according the patient his wife he has been doing fair.  He is had no chest pain or dyspnea.  He continues to be unsteady on his feet.  He has had no palpitations or reported feelings of his heart racing.  He denies any lower extremity edema.            Past Medical History:   Diagnosis Date   • Abdominal aortic aneurysm (AAA) (CMS/[facility]) 3/7/2018   • Cerebrovascular accident (CVA) (CMS/[facility]) 8/8/2019   • Essential hypertension 8/8/2019   • History of echocardiogram 08/2018   • History of Holter monitoring 08/2018   • Hyperlipidemia    • Hyperlipidemia, mixed 8/8/2019   • Hypertension    • Paroxysmal atrial fibrillation (CMS/[facility]) 10/15/2012   • Presence of cardiac pacemaker 6/28/2019    BS PM implant 9/2018   • Sick sinus syndrome (CMS/[facility]) 6/28/2019   • Stroke (CMS/[facility])        Past Surgical History:   Procedure Laterality Date   • CHOLECYSTECTOMY     • TURP / TRANSURETHRAL INCISION / DRAINAGE PROSTATE      TURP         Current Outpatient Medications:   •  carbidopa-levodopa (PARCOPA)  MG per disintegrating tablet, Every 12 (Twelve) Hours., Disp: , Rfl:   •  ELIQUIS 2.5 MG tablet tablet, 2 (Two) Times a Day., Disp: , Rfl: 1  •  ipratropium (ATROVENT) 0.03 % nasal spray, U 2 SPRAYS IEN 2 TO 3 XD, Disp: , Rfl:   •  MAGNESIUM-OXIDE 400 (241.3 Mg) MG tablet tablet, Take   by mouth Daily., Disp: , Rfl: 3  •  polyethylene glycol (MIRALAX) powder, Daily., Disp: , Rfl:   •  potassium chloride ER (K-TAB) 20 MEQ tablet controlled-release ER tablet, Take 20 mEq by mouth 2 (Two) Times a Day., Disp: , Rfl:   •  rosuvastatin (CRESTOR) 10 MG tablet, Take 10 mg by mouth Daily., Disp: , Rfl:   •  levocetirizine (XYZAL) 5 MG tablet, Take 5 mg by mouth Every Evening., Disp: , Rfl:   •  metoprolol succinate XL (TOPROL-XL) 25 MG 24 hr tablet, Take 1 tablet by mouth Daily., Disp: 30 tablet, Rfl: 11  •  MYRBETRIQ 50 MG tablet sustained-release 24 hour 24 hr tablet, Take 50 mg by mouth Daily., Disp: , Rfl:   •  pantoprazole (PROTONIX) 40 MG EC tablet, Take  by mouth Daily., Disp: , Rfl: 5    Social History     Socioeconomic History   • Marital status:      Spouse name: Not on file   • Number of children: Not on file   • Years of education: Not on file   • Highest education level: Not on file   Tobacco Use   • Smoking status: Never Smoker   • Smokeless tobacco: Never Used   Substance and Sexual Activity   • Alcohol use: No   • Drug use: No   • Sexual activity: Defer       Family History   Family history unknown: Yes       The following portions of the patient's history were reviewed and updated as appropriate: allergies, current medications, past family history, past medical history, past social history, past surgical history and problem list.    Review of Systems   Constitutional: Negative for decreased appetite and diaphoresis.   HENT: Negative for congestion, hearing loss and nosebleeds.    Cardiovascular: Negative for chest pain, claudication, dyspnea on exertion, irregular heartbeat, leg swelling, near-syncope, orthopnea, palpitations, paroxysmal nocturnal dyspnea and syncope.   Respiratory: Negative for cough, shortness of breath and sleep disturbances due to breathing.    Endocrine: Negative for polyuria.   Hematologic/Lymphatic: Does not bruise/bleed easily.   Skin: Negative for itching  "and rash.   Musculoskeletal: Positive for arthritis, back pain and muscle weakness. Negative for myalgias.   Gastrointestinal: Negative for abdominal pain, change in bowel habit and nausea.   Genitourinary: Negative for dysuria, flank pain, frequency and hesitancy.   Neurological: Positive for disturbances in coordination and weakness. Negative for dizziness and tremors.   Psychiatric/Behavioral: Negative for altered mental status. The patient does not have insomnia.        /72   Pulse 110   Ht 170.2 cm (67\")   Wt 73.9 kg (163 lb)   SpO2 97%   BMI 25.53 kg/m² .    Objective     Constitutional:       General: Not in acute distress.     Appearance: Normal appearance. Well-developed.   Eyes:      Pupils: Pupils are equal, round, and reactive to light.   HENT:      Head: Normocephalic and atraumatic.   Neck:      Vascular: No JVD.   Pulmonary:      Effort: Pulmonary effort is normal.      Breath sounds: Normal breath sounds.   Cardiovascular:      Normal rate. Regular rhythm.   Pulses:     Intact distal pulses.   Edema:     Peripheral edema absent.   Abdominal:      General: There is no distension.      Palpations: Abdomen is soft.      Tenderness: There is no abdominal tenderness.   Musculoskeletal: Normal range of motion.      Cervical back: Normal range of motion and neck supple.      Comments: Ambulates with walker Skin:     General: Skin is warm and dry.   Neurological:      Mental Status: Alert and oriented to person, place, and time.           EKG ordered and reviewed by me in office    ECG 12 Lead    Date/Time: 9/15/2021 2:14 PM  Performed by: Jillian Lunsford APRN  Authorized by: Jillian Lunsford APRN   Comparison: not compared with previous ECG   Rhythm: sinus tachycardia  BPM: 110  Q waves: III and aVF      Clinical impression: abnormal EKG                In Office Device Interrogation: Reviewed    DEVICE INTERROGATION:  IN OFFICE    DEVICE TYPE:   Dual-chamber pacemaker    :   [employer]    BATTERY:  Stable    TIME TO ELECTIVE REPLACEMENT INDICATORS:   6-year    CHARGE TIME:   Not applicable        LEAD DATA:    Atrial:   4.3 mV, 355 ohms, 0.8 V@0.4 ms    Ventricular:     17.8 mV, 627 ohms, 0.3 V@0.4 ms    LV:      Atrial pacing percentage: 50%    Ventricular pacing percentage: 10%      Arrhythmia Logbook Reviewed: No A. fib paroxysms of PSVT noted resting heart rate above 100        Summary:    Stable Device Function    Frequent episodes of PSVT and resting heart rate noted to be over 100    Battery status is stable.      NEXT IN OFFICE DEVICE CHECK DUE: 6 months    REMOTE DEVICE INTERROGATIONS: 3 months      Diagnoses and all orders for this visit:    1. Sick sinus syndrome (CMS/HCC) (Primary)  Comments:  Stable since pacemaker implant    2. Presence of cardiac pacemaker  Comments:  Dual-chamber [employer] pacemaker with stable device function    3. Paroxysmal atrial fibrillation (CMS/HCC)  Comments:  No recurrent atrial fibrillation though he is having episodes of paroxysmal supraventricular tachycardia    4. Essential hypertension    5. PSVT (paroxysmal supraventricular tachycardia) (CMS/HCC)  Comments:  Pacemaker interrogation reveals multiple episodes of PSVT    Other orders  -     metoprolol succinate XL (TOPROL-XL) 25 MG 24 hr tablet; Take 1 tablet by mouth Daily.  Dispense: 30 tablet; Refill: 11           MEDICAL DECISION MAKING:           Patient is here today for follow-up regarding his history of sick sinus syndrome, previous dual-chamber pacemaker.    His resting heart rate is elevated today.  Reviewing his home medicines he is reported to be on Toprol-XL 50 mg daily.    However upon review of his medications closely we contacted his pharmacy reports he has not had that medicine filled in over 1 year.    I have talked to his wife and encouraged her to bring all of his medicines with him to every appointment.  I have resent a prescription for metoprolol XL 25 mg once  daily to his pharmacy and advised her to start those today.    The patient is not having any other signs of acute cardiac decompensation.  He is having progressive decline in his functional status due to unsteady gait and coordination difficulties.    He denies any overt chest pain or dyspnea.    We will plan on seeing him for scheduled follow-up back here in 3 months.  The patient and his wife wish to follow-up with Dr. Odell since the wife sees Dr. Odell as well I will continue to monitor his pacemaker via [employer].  If he develops any new or worsening problems of asked his wife to contact our office sooner for follow-up

## 2021-10-15 PROCEDURE — 93294 REM INTERROG EVL PM/LDLS PM: CPT | Performed by: NURSE PRACTITIONER

## 2021-10-15 PROCEDURE — 93296 REM INTERROG EVL PM/IDS: CPT | Performed by: NURSE PRACTITIONER

## 2021-12-14 NOTE — PROGRESS NOTES
Date of Office Visit: 12/15/2021  Encounter Provider: Dr. Jordy Odell  Place of Service: [facility] CARDIOLOGY [place]  Patient Name: Jason Callejas  :1926  Anish Goddard MD    Chief Complaint   Patient presents with   • Stroke     hospital follow up   • Atrial Fibrillation   • Hypertension   • Hyperlipidemia     History of Present Illness:    I am pleased to see Mr. Callejas in my office today as a follow-up.    As you know, patient is 89-year-old white gentleman whose past medical history significant for sick sinus syndrome, s/p pacemaker, Parkinson disease, hyperlipidemia, who came today for follow-up.    Patient at present is doing well.  Patient is on Eliquis for atrial fibrillation.  Patient complain of post shingle pain at the back.  Patient has mild shortness of breath.  No syncope or presyncope.  Patient reports disequilibrium.    EKG showed sinus rhythm with heart rate of 105 bpm    I would like to increase Toprol-XL but because of risk of low blood pressure I would not increase it.  Patient is very unsteady on his gait.  I will continue current treatment.  Pacemaker will be interrogated in next visit.      Past Medical History:   Diagnosis Date   • Abdominal aortic aneurysm (AAA) ([facility]) 3/7/2018   • Cerebrovascular accident (CVA) ([facility]) 2019   • Essential hypertension 2019   • History of echocardiogram 2018   • History of Holter monitoring 2018   • Hyperlipidemia    • Hyperlipidemia, mixed 2019   • Hypertension    • Paroxysmal atrial fibrillation (HCC) 10/15/2012   • Presence of cardiac pacemaker 2019    BS PM implant 2018   • Sick sinus syndrome ([facility]) 2019   • Stroke ([facility])          Past Surgical History:   Procedure Laterality Date   • CHOLECYSTECTOMY     • TURP / TRANSURETHRAL INCISION / DRAINAGE PROSTATE      TURP           Current Outpatient Medications:   •  carbidopa-levodopa (PARCOPA)  MG per disintegrating tablet, Every 12 (Twelve) Hours., Disp: ,  Rfl:   •  donepezil (ARICEPT) 10 MG tablet, Take 10 mg by mouth Every Evening., Disp: , Rfl:   •  ELIQUIS 2.5 MG tablet tablet, 2 (Two) Times a Day., Disp: , Rfl: 1  •  ipratropium (ATROVENT) 0.03 % nasal spray, U 2 SPRAYS IEN 2 TO 3 XD, Disp: , Rfl:   •  MAGNESIUM-OXIDE 400 (241.3 Mg) MG tablet tablet, Take  by mouth Daily., Disp: , Rfl: 3  •  metoprolol succinate XL (TOPROL-XL) 25 MG 24 hr tablet, Take 1 tablet by mouth Daily., Disp: 30 tablet, Rfl: 11  •  pantoprazole (PROTONIX) 40 MG EC tablet, Take 40 mg by mouth Daily., Disp: , Rfl:   •  polyethylene glycol (MIRALAX) powder, Daily., Disp: , Rfl:   •  potassium chloride (K-DUR,KLOR-CON) 20 MEQ CR tablet, TAKE ONE TABLET BY MOUTH TWICE DAILY WITH BREAKFAST AND SUPPER, Disp: , Rfl:   •  rosuvastatin (CRESTOR) 10 MG tablet, Take 10 mg by mouth Daily., Disp: , Rfl:       Social History     Socioeconomic History   • Marital status:    Tobacco Use   • Smoking status: Never Smoker   • Smokeless tobacco: Never Used   Vaping Use   • Vaping Use: Never used   Substance and Sexual Activity   • Alcohol use: No   • Drug use: No   • Sexual activity: Defer         Review of Systems   Constitutional: Negative for chills and fever.   HENT: Negative for ear discharge and nosebleeds.    Eyes: Negative for discharge and redness.   Cardiovascular: Negative for chest pain, orthopnea, palpitations, paroxysmal nocturnal dyspnea and syncope.   Respiratory: Positive for shortness of breath. Negative for cough and wheezing.    Endocrine: Negative for heat intolerance.   Skin: Negative for rash.   Musculoskeletal: Positive for arthritis, back pain and joint pain. Negative for myalgias.   Gastrointestinal: Negative for abdominal pain, melena, nausea and vomiting.   Genitourinary: Negative for dysuria and hematuria.   Neurological: Positive for disturbances in coordination and dizziness. Negative for light-headedness, numbness and tremors.   Psychiatric/Behavioral: Negative for  "depression. The patient is not nervous/anxious.        Procedures      ECG 12 Lead    Date/Time: 12/15/2021 3:01 PM  Performed by: Jordy dOell MD  Authorized by: Jordy Odell MD   Comparison: compared with previous ECG   Similar to previous ECG  Rhythm: sinus rhythm and sinus tachycardia    Clinical impression: abnormal EKG            ECG 12 Lead    (Results Pending)           Objective:    /74   Pulse 105   Ht 170.2 cm (67.01\")   Wt 73.9 kg (163 lb)   BMI 25.52 kg/m²         Constitutional:       Appearance: Well-developed.   Eyes:      General: No scleral icterus.        Right eye: No discharge.   HENT:      Head: Normocephalic and atraumatic.   Neck:      Thyroid: No thyromegaly.      Lymphadenopathy: No cervical adenopathy.   Pulmonary:      Effort: Pulmonary effort is normal. No respiratory distress.      Breath sounds: Normal breath sounds. No wheezing. No rales.   Cardiovascular:      Normal rate. Regular rhythm.      No gallop.   Abdominal:      Tenderness: There is no abdominal tenderness.   Skin:     Findings: No erythema or rash.   Neurological:      Mental Status: Alert and oriented to person, place, and time.             Assessment:       Diagnosis Plan   1. Sick sinus syndrome (HCC)  ECG 12 Lead   2. 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 Paroxysmal atrial fibrillation (HCC)  ECG 12 Lead   4. Essential hypertension  ECG 12 Lead   5. Hyperlipidemia, mixed  ECG 12 Lead   6. PSVT (paroxysmal supraventricular tachycardia) (HCC)  ECG 12 Lead            Plan:       MDM:    1.  Sinus tachycardia:    Patient has sinus tachycardia and I would like to increase metoprolol but because of his relatively low blood pressure and Parkinson's disease, I am afraid that he will have hypotensive episodes.  Recommend observation    2.  Hypertension:    Blood pressure is very well controlled    3.  S/p permanent pacemaker:    Pacemaker is not interrogated " today and will be interrogated on next visit.    4.  Paroxysmal atrial fibrillation:    Patient is on Eliquis.  Continue to observe

## 2021-12-15 ENCOUNTER — OFFICE VISIT (OUTPATIENT)
Dept: CARDIOLOGY | Facility: CLINIC | Age: 86
End: 2021-12-15

## 2021-12-15 VITALS
SYSTOLIC BLOOD PRESSURE: 122 MMHG | WEIGHT: 163 LBS | HEART RATE: 105 BPM | DIASTOLIC BLOOD PRESSURE: 74 MMHG | BODY MASS INDEX: 25.58 KG/M2 | HEIGHT: 67 IN

## 2021-12-15 DIAGNOSIS — I47.1 PSVT (PAROXYSMAL SUPRAVENTRICULAR TACHYCARDIA) (HCC): ICD-10-CM

## 2021-12-15 DIAGNOSIS — I63.10 CEREBROVASCULAR ACCIDENT (CVA) DUE TO EMBOLISM OF PRECEREBRAL ARTERY (HCC): ICD-10-CM

## 2021-12-15 DIAGNOSIS — I10 ESSENTIAL HYPERTENSION: ICD-10-CM

## 2021-12-15 DIAGNOSIS — E78.2 HYPERLIPIDEMIA, MIXED: ICD-10-CM

## 2021-12-15 DIAGNOSIS — I49.5 SICK SINUS SYNDROME (HCC): Primary | ICD-10-CM

## 2021-12-15 DIAGNOSIS — I48.0 PAROXYSMAL ATRIAL FIBRILLATION (HCC): ICD-10-CM

## 2021-12-15 PROCEDURE — 93000 ELECTROCARDIOGRAM COMPLETE: CPT | Performed by: INTERNAL MEDICINE

## 2021-12-15 PROCEDURE — 99214 OFFICE O/P EST MOD 30 MIN: CPT | Performed by: INTERNAL MEDICINE

## 2021-12-15 RX ORDER — PANTOPRAZOLE SODIUM 40 MG/1
40 TABLET, DELAYED RELEASE ORAL DAILY
COMMUNITY
Start: 2021-11-16

## 2021-12-15 RX ORDER — POTASSIUM CHLORIDE 20 MEQ/1
TABLET, EXTENDED RELEASE ORAL
COMMUNITY
Start: 2021-09-20

## 2021-12-15 RX ORDER — DONEPEZIL HYDROCHLORIDE 10 MG/1
10 TABLET, FILM COATED ORAL EVERY EVENING
COMMUNITY
Start: 2021-10-14

## 2022-01-14 PROCEDURE — 93294 REM INTERROG EVL PM/LDLS PM: CPT | Performed by: NURSE PRACTITIONER

## 2022-01-14 PROCEDURE — 93296 REM INTERROG EVL PM/IDS: CPT | Performed by: NURSE PRACTITIONER

## 2022-03-10 ENCOUNTER — TRANSCRIBE ORDERS (OUTPATIENT)
Dept: ADMINISTRATIVE | Facility: HOSPITAL | Age: 87
End: 2022-03-10

## 2022-03-10 ENCOUNTER — LAB (OUTPATIENT)
Dept: LAB | Facility: HOSPITAL | Age: 87
End: 2022-03-10

## 2022-03-10 DIAGNOSIS — E87.5 SERUM POTASSIUM ELEVATED: ICD-10-CM

## 2022-03-10 DIAGNOSIS — E87.5 SERUM POTASSIUM ELEVATED: Primary | ICD-10-CM

## 2022-03-10 LAB
ANION GAP SERPL CALCULATED.3IONS-SCNC: 7 MMOL/L (ref 5–15)
BUN SERPL-MCNC: 20 MG/DL (ref 8–23)
BUN/CREAT SERPL: 20.8 (ref 7–25)
CALCIUM SPEC-SCNC: 9.1 MG/DL (ref 8.2–9.6)
CHLORIDE SERPL-SCNC: 103 MMOL/L (ref 98–107)
CO2 SERPL-SCNC: 32 MMOL/L (ref 22–29)
CREAT SERPL-MCNC: 0.96 MG/DL (ref 0.76–1.27)
EGFRCR SERPLBLD CKD-EPI 2021: 72.8 ML/MIN/1.73
GLUCOSE SERPL-MCNC: 95 MG/DL (ref 65–99)
POTASSIUM SERPL-SCNC: 4.7 MMOL/L (ref 3.5–5.2)
SODIUM SERPL-SCNC: 142 MMOL/L (ref 136–145)

## 2022-03-10 PROCEDURE — 36415 COLL VENOUS BLD VENIPUNCTURE: CPT

## 2022-03-10 PROCEDURE — 80048 BASIC METABOLIC PNL TOTAL CA: CPT

## 2022-04-15 PROCEDURE — 93296 REM INTERROG EVL PM/IDS: CPT | Performed by: NURSE PRACTITIONER

## 2022-04-15 PROCEDURE — 93294 REM INTERROG EVL PM/LDLS PM: CPT | Performed by: NURSE PRACTITIONER

## 2022-09-12 RX ORDER — METOPROLOL SUCCINATE 25 MG/1
TABLET, EXTENDED RELEASE ORAL
Qty: 30 TABLET | Refills: 10 | Status: SHIPPED | OUTPATIENT
Start: 2022-09-12

## 2022-10-03 ENCOUNTER — OFFICE VISIT (OUTPATIENT)
Dept: CARDIOLOGY | Facility: CLINIC | Age: 87
End: 2022-10-03

## 2022-10-03 ENCOUNTER — CLINICAL SUPPORT NO REQUIREMENTS (OUTPATIENT)
Dept: CARDIOLOGY | Facility: CLINIC | Age: 87
End: 2022-10-03

## 2022-10-03 VITALS
WEIGHT: 163 LBS | OXYGEN SATURATION: 98 % | HEIGHT: 67 IN | DIASTOLIC BLOOD PRESSURE: 74 MMHG | HEART RATE: 83 BPM | SYSTOLIC BLOOD PRESSURE: 131 MMHG | BODY MASS INDEX: 25.58 KG/M2

## 2022-10-03 DIAGNOSIS — I49.5 SICK SINUS SYNDROME: Primary | ICD-10-CM

## 2022-10-03 DIAGNOSIS — Z95.0 PRESENCE OF CARDIAC PACEMAKER: ICD-10-CM

## 2022-10-03 DIAGNOSIS — I48.0 PAROXYSMAL ATRIAL FIBRILLATION: ICD-10-CM

## 2022-10-03 DIAGNOSIS — I10 ESSENTIAL HYPERTENSION: ICD-10-CM

## 2022-10-03 PROCEDURE — 93280 PM DEVICE PROGR EVAL DUAL: CPT | Performed by: NURSE PRACTITIONER

## 2022-10-03 PROCEDURE — 93000 ELECTROCARDIOGRAM COMPLETE: CPT | Performed by: NURSE PRACTITIONER

## 2022-10-03 PROCEDURE — 99213 OFFICE O/P EST LOW 20 MIN: CPT | Performed by: NURSE PRACTITIONER

## 2022-10-03 NOTE — PROGRESS NOTES
Cardiology Office Follow Up Visit      Primary Care Provider:  Anish Goddard MD    Reason for f/u:     Sick sinus syndrome  Dual-chamber pacemaker  Hypertension  Dyslipidemia  Paroxysmal atrial fibrillation      Subjective     CC:    Denies chest pain or dyspnea    History of Present Illness       Jason Callejas is a 89 y.o. male.  Patient is a very pleasant 89-year-old male who is here today to follow-up due to his known sick sinus syndrome, dual-chamber pacemaker, paroxysmal atrial fibrillation and hypertension.    Patient denies any current or recent chest pain.  He has had no PND, orthopnea, palpitations, near syncope, feelings of his heart racing.  He is here with his wife today and they report compliance with current medical therapy.    He ambulates with a walker and reports that he has not had any recent falls      ASSESSMENT/PLAN:        Diagnoses and all orders for this visit:    1. Sick sinus syndrome (HCC) (Primary)  Comments:  Stable since pacemaker implant    2. Presence of cardiac pacemaker  Comments:  Dual-chamber [employer] pacemaker with stable device function by an office interrogation today    3. Paroxysmal atrial fibrillation (HCC)  Comments:  No sustained atrial fibrillation    4. Essential hypertension  Comments:  Blood pressure stable on current medical therapy           MEDICAL DECISION MAKING:    Patient's appears to be well compensated from a cardiac standpoint.  He is having no chest pain or signs to suggest angina.  He does not appear to be in heart failure.  His pacemaker was interrogated in the office today and is working well.  His battery still has 5 years remaining.  He has had no A. fib.    Blood pressure stable on current medical therapy.    I made no changes in his medication.  We will continue the current treatment.  We will continue monitoring his pacemaker via [employer] from home.    I scheduled the patient back for follow-up with Dr. Odell in 1 year if he develops  any new or worsening problems of asked him to.      Past Medical History:   Diagnosis Date   • Abdominal aortic aneurysm (AAA) 3/7/2018   • Cerebrovascular accident (CVA) (HCC) 8/8/2019   • Essential hypertension 8/8/2019   • History of echocardiogram 08/2018   • History of Holter monitoring 08/2018   • Hyperlipidemia    • Hyperlipidemia, mixed 8/8/2019   • Hypertension    • Paroxysmal atrial fibrillation (HCC) 10/15/2012   • Presence of cardiac pacemaker 6/28/2019    BS PM implant 9/2018   • Sick sinus syndrome (HCC) 6/28/2019   • Stroke (HCC)        Past Surgical History:   Procedure Laterality Date   • CHOLECYSTECTOMY     • TURP / TRANSURETHRAL INCISION / DRAINAGE PROSTATE      TURP         Current Outpatient Medications:   •  carbidopa-levodopa (PARCOPA)  MG per disintegrating tablet, Every 12 (Twelve) Hours., Disp: , Rfl:   •  donepezil (ARICEPT) 10 MG tablet, Take 10 mg by mouth Every Evening., Disp: , Rfl:   •  ELIQUIS 2.5 MG tablet tablet, 2 (Two) Times a Day., Disp: , Rfl: 1  •  ipratropium (ATROVENT) 0.03 % nasal spray, U 2 SPRAYS IEN 2 TO 3 XD, Disp: , Rfl:   •  MAGNESIUM-OXIDE 400 (241.3 Mg) MG tablet tablet, Take  by mouth Daily., Disp: , Rfl: 3  •  metoprolol succinate XL (TOPROL-XL) 25 MG 24 hr tablet, TAKE 1 TABLET BY MOUTH EVERY DAY, Disp: 30 tablet, Rfl: 10  •  pantoprazole (PROTONIX) 40 MG EC tablet, Take 40 mg by mouth Daily., Disp: , Rfl:   •  polyethylene glycol (MIRALAX) powder, Daily., Disp: , Rfl:   •  potassium chloride (K-DUR,KLOR-CON) 20 MEQ CR tablet, TAKE ONE TABLET BY MOUTH TWICE DAILY WITH BREAKFAST AND SUPPER, Disp: , Rfl:   •  rosuvastatin (CRESTOR) 10 MG tablet, Take 10 mg by mouth Daily., Disp: , Rfl:     Social History     Socioeconomic History   • Marital status:    Tobacco Use   • Smoking status: Never Smoker   • Smokeless tobacco: Never Used   Vaping Use   • Vaping Use: Never used   Substance and Sexual Activity   • Alcohol use: No   • Drug use: No   • Sexual  "activity: Defer       Family History   Family history unknown: Yes       The following portions of the patient's history were reviewed and updated as appropriate: allergies, current medications, past family history, past medical history, past social history, past surgical history and problem list.    Review of Systems   Constitutional: Positive for malaise/fatigue. Negative for decreased appetite and diaphoresis.   HENT: Negative for congestion, hearing loss and nosebleeds.    Cardiovascular: Negative for chest pain, claudication, dyspnea on exertion, irregular heartbeat, leg swelling, near-syncope, orthopnea, palpitations, paroxysmal nocturnal dyspnea and syncope.   Respiratory: Negative for cough, shortness of breath and sleep disturbances due to breathing.    Endocrine: Negative for polyuria.   Hematologic/Lymphatic: Does not bruise/bleed easily.   Skin: Negative for itching and rash.   Musculoskeletal: Positive for arthritis. Negative for back pain, muscle weakness and myalgias.   Gastrointestinal: Negative for abdominal pain, change in bowel habit and nausea.   Genitourinary: Negative for dysuria, flank pain, frequency and hesitancy.   Neurological: Positive for disturbances in coordination and weakness. Negative for dizziness and tremors.   Psychiatric/Behavioral: Negative for altered mental status. The patient does not have insomnia.        /74 (BP Location: Left arm, Patient Position: Sitting, Cuff Size: Large Adult)   Pulse 83   Ht 170.2 cm (67.01\")   Wt 73.9 kg (163 lb)   SpO2 98%   BMI 25.52 kg/m² .    Objective     Vitals reviewed.   Constitutional:       General: Not in acute distress.     Appearance: Normal appearance. Well-developed.   Eyes:      Pupils: Pupils are equal, round, and reactive to light.   HENT:      Head: Normocephalic and atraumatic.   Neck:      Vascular: No JVD.   Pulmonary:      Effort: Pulmonary effort is normal.      Breath sounds: Normal breath sounds. "   Cardiovascular:      Normal rate. Regular rhythm.   Pulses:     Intact distal pulses.   Edema:     Peripheral edema absent.   Abdominal:      General: There is no distension.      Palpations: Abdomen is soft.      Tenderness: There is no abdominal tenderness.   Musculoskeletal: Normal range of motion.      Cervical back: Normal range of motion and neck supple.      Comments: Ambulates with walker  Skin:     General: Skin is warm and dry.   Neurological:      Mental Status: Alert and oriented to person, place, and time.           EKG ordered and reviewed by me in office    ECG 12 Lead    Date/Time: 10/3/2022 1:45 PM  Performed by: Jillian Lunsford APRN  Authorized by: Jillian Lunsford APRN   Comparison: compared with previous ECG   Similar to previous ECG  Rhythm: sinus rhythm  BPM: 83  Conduction: 1st degree AV block  Q waves: II, III, aVF, V1, V2 and V3    Other: no other findings    Clinical impression: abnormal EKG                In Office Device Interrogation: Reviewed    DEVICE INTERROGATION:  IN OFFICE    DEVICE TYPE:   Dual-chamber pacemaker    :   [employer]    BATTERY:  Stable    TIME TO ELECTIVE REPLACEMENT INDICATORS:   5 years    CHARGE TIME:   Not happy        LEAD DATA:   LEADS Reprogrammed for testing purposes    Atrial:   5.3 mV, 355 ohms, 1.0 V@0.4 ms    Ventricular:     13.1 mV, 599 ohms, 0.5 V@0.4 ms    LV:      Pacemaker Dependent: No      Atrial pacing percentage: 50%    Ventricular pacing percentage: 11%      Arrhythmia Logbook Reviewed: No A. fib        Summary:    Stable Device Function    No significant arhythmia burden.     Battery status is stable.      NEXT IN OFFICE DEVICE CHECK DUE: 1 year    REMOTE DEVICE INTERROGATIONS: Ongoing

## 2022-10-14 PROCEDURE — 93294 REM INTERROG EVL PM/LDLS PM: CPT | Performed by: NURSE PRACTITIONER

## 2022-10-14 PROCEDURE — 93296 REM INTERROG EVL PM/IDS: CPT | Performed by: NURSE PRACTITIONER

## 2023-01-13 PROCEDURE — 93294 REM INTERROG EVL PM/LDLS PM: CPT | Performed by: NURSE PRACTITIONER

## 2023-01-13 PROCEDURE — 93296 REM INTERROG EVL PM/IDS: CPT | Performed by: NURSE PRACTITIONER

## 2023-04-14 PROCEDURE — 93294 REM INTERROG EVL PM/LDLS PM: CPT | Performed by: NURSE PRACTITIONER

## 2023-04-14 PROCEDURE — 93296 REM INTERROG EVL PM/IDS: CPT | Performed by: NURSE PRACTITIONER
